# Patient Record
Sex: MALE | Race: WHITE
[De-identification: names, ages, dates, MRNs, and addresses within clinical notes are randomized per-mention and may not be internally consistent; named-entity substitution may affect disease eponyms.]

---

## 2020-05-12 ENCOUNTER — HOSPITAL ENCOUNTER (OUTPATIENT)
Dept: HOSPITAL 47 - RADUSWWP | Age: 45
Discharge: HOME | End: 2020-05-12
Attending: FAMILY MEDICINE
Payer: COMMERCIAL

## 2020-05-12 DIAGNOSIS — R10.12: ICD-10-CM

## 2020-05-12 DIAGNOSIS — R10.13: ICD-10-CM

## 2020-05-12 DIAGNOSIS — R10.11: Primary | ICD-10-CM

## 2020-05-12 PROCEDURE — 76705 ECHO EXAM OF ABDOMEN: CPT

## 2020-05-12 NOTE — US
EXAMINATION TYPE: US abdomen limited

 

DATE OF EXAM: 5/12/2020

 

COMPARISON: NONE

 

CLINICAL HISTORY: R10.13 Epigastric pain, R10.11 right upper quadrant. Constant epigastric pain x 1 m
onth, occasionally increases in intensity

 

EXAM MEASUREMENTS:

 

Liver Length:  15.8 cm   

Gallbladder Wall:  0.1 cm   

CBD:  0.4 cm

Right Kidney:  10.3 x 4.7 x 4.5 cm

 

 

 

Pancreas:  visualized portions wnl, limited by overlying midline bowel gas

Liver:  wnl  

Gallbladder:  wnl

**Evidence for sonographic Alexandra's sign:  no

CBD:  wnl 

Right Kidney:  wnl 

 

 

 

IMPRESSION: 

1. Right upper quadrant ultrasound appears unremarkable.

## 2020-07-09 ENCOUNTER — HOSPITAL ENCOUNTER (OUTPATIENT)
Dept: HOSPITAL 47 - RADXRMAIN | Age: 45
Discharge: HOME | End: 2020-07-09
Attending: FAMILY MEDICINE
Payer: COMMERCIAL

## 2020-07-09 DIAGNOSIS — R05: ICD-10-CM

## 2020-07-09 DIAGNOSIS — R07.82: Primary | ICD-10-CM

## 2020-07-09 LAB
ALBUMIN SERPL-MCNC: 4.4 G/DL (ref 3.5–5)
ALP SERPL-CCNC: 89 U/L (ref 38–126)
ALT SERPL-CCNC: 18 U/L (ref 4–49)
ANION GAP SERPL CALC-SCNC: 8 MMOL/L
AST SERPL-CCNC: 21 U/L (ref 17–59)
BASOPHILS # BLD AUTO: 0.1 K/UL (ref 0–0.2)
BASOPHILS NFR BLD AUTO: 1 %
BUN SERPL-SCNC: 10 MG/DL (ref 9–20)
CALCIUM SPEC-MCNC: 9.3 MG/DL (ref 8.4–10.2)
CHLORIDE SERPL-SCNC: 102 MMOL/L (ref 98–107)
CO2 SERPL-SCNC: 25 MMOL/L (ref 22–30)
EOSINOPHIL # BLD AUTO: 0.4 K/UL (ref 0–0.7)
EOSINOPHIL NFR BLD AUTO: 3 %
ERYTHROCYTE [DISTWIDTH] IN BLOOD BY AUTOMATED COUNT: 4.99 M/UL (ref 4.3–5.9)
ERYTHROCYTE [DISTWIDTH] IN BLOOD: 12.5 % (ref 11.5–15.5)
GLUCOSE SERPL-MCNC: 92 MG/DL (ref 74–99)
HCT VFR BLD AUTO: 47 % (ref 39–53)
HGB BLD-MCNC: 15.2 GM/DL (ref 13–17.5)
LYMPHOCYTES # SPEC AUTO: 2.4 K/UL (ref 1–4.8)
LYMPHOCYTES NFR SPEC AUTO: 22 %
MCH RBC QN AUTO: 30.4 PG (ref 25–35)
MCHC RBC AUTO-ENTMCNC: 32.4 G/DL (ref 31–37)
MCV RBC AUTO: 94.1 FL (ref 80–100)
MONOCYTES # BLD AUTO: 0.7 K/UL (ref 0–1)
MONOCYTES NFR BLD AUTO: 6 %
NEUTROPHILS # BLD AUTO: 7.3 K/UL (ref 1.3–7.7)
NEUTROPHILS NFR BLD AUTO: 67 %
PLATELET # BLD AUTO: 302 K/UL (ref 150–450)
POTASSIUM SERPL-SCNC: 4.4 MMOL/L (ref 3.5–5.1)
PROT SERPL-MCNC: 7 G/DL (ref 6.3–8.2)
SODIUM SERPL-SCNC: 135 MMOL/L (ref 137–145)
WBC # BLD AUTO: 11 K/UL (ref 3.8–10.6)

## 2020-07-09 PROCEDURE — 80053 COMPREHEN METABOLIC PANEL: CPT

## 2020-07-09 PROCEDURE — 71046 X-RAY EXAM CHEST 2 VIEWS: CPT

## 2020-07-09 PROCEDURE — 86140 C-REACTIVE PROTEIN: CPT

## 2020-07-09 PROCEDURE — 85652 RBC SED RATE AUTOMATED: CPT

## 2020-07-09 PROCEDURE — 85025 COMPLETE CBC W/AUTO DIFF WBC: CPT

## 2020-07-09 NOTE — XR
EXAMINATION TYPE: XR chest 2V

 

DATE OF EXAM: 7/9/2020

 

COMPARISON: NONE

 

HISTORY: Cough and rib pain

 

TECHNIQUE:  Frontal and lateral views of the chest are obtained.

 

FINDINGS:  There is no focal air space opacity, pleural effusion, or pneumothorax seen.  The cardiac 
silhouette size is within normal limits.   The osseous structures are intact. Lung apices not entirel
y included on exam.

 

IMPRESSION:  No acute cardiopulmonary process.

## 2020-07-29 ENCOUNTER — HOSPITAL ENCOUNTER (OUTPATIENT)
Dept: HOSPITAL 47 - RADNMMAIN | Age: 45
Discharge: HOME | End: 2020-07-29
Attending: FAMILY MEDICINE
Payer: COMMERCIAL

## 2020-07-29 DIAGNOSIS — R10.11: Primary | ICD-10-CM

## 2020-07-29 DIAGNOSIS — Z88.8: ICD-10-CM

## 2020-07-29 DIAGNOSIS — R10.13: ICD-10-CM

## 2020-07-29 DIAGNOSIS — Z91.018: ICD-10-CM

## 2020-07-29 PROCEDURE — 78226 HEPATOBILIARY SYSTEM IMAGING: CPT

## 2020-07-29 NOTE — NM
EXAMINATION TYPE: NM hepatobiliary w EF

 

DATE OF EXAM: 7/29/2020

 

COMPARISON: Ultrasound abdomen 5/12/2020

 

HISTORY: Right upper quadrant pain

 

TECHNIQUE: After the intravenous administration of 4.89 mCi Tc 99m Mebrofenin hepatobiliary scintigra
phy is performed.  Immediate images post injection.

 

FINDINGS: 

There is satisfactory initial accumulation of tracer by the liver.  The gallbladder is visualized wit
hin 15 minutes.  The small bowel activity is noted within 15 minutes.  At one hour 8 ounces of oral e
nsure plus is given to mimic CCK and gallbladder ejection fraction is calculated at 73 %, in the norm
al range.  Therefore there is no scintigraphic evidence of cystic or common bile duct obstruction to 
suggest acute cholecystitis or gallbladder dyskinesia. 

 

IMPRESSION: Exam is within normal limits.

## 2020-08-20 ENCOUNTER — HOSPITAL ENCOUNTER (OUTPATIENT)
Dept: HOSPITAL 47 - RADCTMAIN | Age: 45
Discharge: HOME | End: 2020-08-20
Attending: INTERNAL MEDICINE
Payer: COMMERCIAL

## 2020-08-20 DIAGNOSIS — R10.13: Primary | ICD-10-CM

## 2020-08-20 PROCEDURE — 74160 CT ABDOMEN W/CONTRAST: CPT

## 2020-08-20 NOTE — CT
EXAMINATION TYPE: CT abdomen w con

 

DATE OF EXAM: 8/20/2020

 

COMPARISON: Ultrasound abdomen limited May 12, 2020

 

HISTORY: Epigastric pain

 

CT DLP: 401.1 mGycm, Automated Exposure Control for Dose Reduction was Utilized.

 

CONTRAST: 

CT scan of the abdomen is performed with oral and with IV Contrast, patient injected with 100 mL of I
sovue 300.

 

FINDINGS:

 

LUNG BASES:  No significant abnormality is appreciated.

 

LIVER/GB:   No significant abnormality is appreciated.

 

PANCREAS:  No significant abnormality is seen.

 

SPLEEN:  No significant abnormality is seen.

 

ADRENALS:  No significant abnormality is seen.

 

KIDNEYS: Symmetrical medullary uptake and excretion without hydronephrosis seen bilaterally.

 

BOWEL: Oral contrast reaches level proximal transverse colon making evaluation of visualized distal b
owel slightly suboptimal. No suspicious small or large bowel dilatation. There is mild to moderate wa
ll thickening in the proximal transverse colon through the visualized portion of the distal left colo
n. Normal-appearing appendix incidentally noted from the cecum in the right lower quadrant extending 
medially.

 

LYMPH NODES:  No greater than 1cm abdominal lymph nodes are appreciated.

 

OSSEOUS STRUCTURES: Mild-to-moderate disc space narrowing lumbosacral junction.

 

OTHER:  No significant additional abnormality is seen.

 

IMPRESSION: Possible mild to borderline moderate uncomplicated acute colitis versus product of poor d
istention. Correlate clinically. Differential would include infectious and/or inflammatory etiologies
.

## 2021-01-03 ENCOUNTER — HOSPITAL ENCOUNTER (EMERGENCY)
Dept: HOSPITAL 47 - EC | Age: 46
Discharge: HOME | End: 2021-01-03
Payer: COMMERCIAL

## 2021-01-03 VITALS — RESPIRATION RATE: 18 BRPM

## 2021-01-03 VITALS — HEART RATE: 78 BPM | DIASTOLIC BLOOD PRESSURE: 82 MMHG | SYSTOLIC BLOOD PRESSURE: 128 MMHG | TEMPERATURE: 97.7 F

## 2021-01-03 DIAGNOSIS — Z88.6: ICD-10-CM

## 2021-01-03 DIAGNOSIS — R07.9: ICD-10-CM

## 2021-01-03 DIAGNOSIS — R00.2: Primary | ICD-10-CM

## 2021-01-03 DIAGNOSIS — F17.200: ICD-10-CM

## 2021-01-03 LAB
ALBUMIN SERPL-MCNC: 4.4 G/DL (ref 3.5–5)
ALP SERPL-CCNC: 72 U/L (ref 38–126)
ALT SERPL-CCNC: 25 U/L (ref 4–49)
AMYLASE SERPL-CCNC: 60 U/L (ref 30–110)
ANION GAP SERPL CALC-SCNC: 7 MMOL/L
APTT BLD: 22.9 SEC (ref 22–30)
AST SERPL-CCNC: 30 U/L (ref 17–59)
BASOPHILS # BLD AUTO: 0.2 K/UL (ref 0–0.2)
BASOPHILS NFR BLD AUTO: 2 %
BUN SERPL-SCNC: 13 MG/DL (ref 9–20)
CALCIUM SPEC-MCNC: 9 MG/DL (ref 8.4–10.2)
CHLORIDE SERPL-SCNC: 103 MMOL/L (ref 98–107)
CO2 SERPL-SCNC: 25 MMOL/L (ref 22–30)
EOSINOPHIL # BLD AUTO: 0.6 K/UL (ref 0–0.7)
EOSINOPHIL NFR BLD AUTO: 7 %
ERYTHROCYTE [DISTWIDTH] IN BLOOD BY AUTOMATED COUNT: 4.91 M/UL (ref 4.3–5.9)
ERYTHROCYTE [DISTWIDTH] IN BLOOD: 12.1 % (ref 11.5–15.5)
GLUCOSE SERPL-MCNC: 104 MG/DL (ref 74–99)
HCT VFR BLD AUTO: 43.8 % (ref 39–53)
HGB BLD-MCNC: 14.9 GM/DL (ref 13–17.5)
INR PPP: 1 (ref ?–1.2)
LIPASE SERPL-CCNC: 114 U/L (ref 23–300)
LYMPHOCYTES # SPEC AUTO: 3.6 K/UL (ref 1–4.8)
LYMPHOCYTES NFR SPEC AUTO: 42 %
MAGNESIUM SPEC-SCNC: 1.7 MG/DL (ref 1.6–2.3)
MCH RBC QN AUTO: 30.3 PG (ref 25–35)
MCHC RBC AUTO-ENTMCNC: 34 G/DL (ref 31–37)
MCV RBC AUTO: 89.2 FL (ref 80–100)
MONOCYTES # BLD AUTO: 0.7 K/UL (ref 0–1)
MONOCYTES NFR BLD AUTO: 8 %
NEUTROPHILS # BLD AUTO: 3.3 K/UL (ref 1.3–7.7)
NEUTROPHILS NFR BLD AUTO: 38 %
PLATELET # BLD AUTO: 242 K/UL (ref 150–450)
POTASSIUM SERPL-SCNC: 4.2 MMOL/L (ref 3.5–5.1)
PROT SERPL-MCNC: 7.4 G/DL (ref 6.3–8.2)
PT BLD: 10.1 SEC (ref 9–12)
SODIUM SERPL-SCNC: 135 MMOL/L (ref 137–145)
WBC # BLD AUTO: 8.7 K/UL (ref 3.8–10.6)

## 2021-01-03 PROCEDURE — 71046 X-RAY EXAM CHEST 2 VIEWS: CPT

## 2021-01-03 PROCEDURE — 36415 COLL VENOUS BLD VENIPUNCTURE: CPT

## 2021-01-03 PROCEDURE — 83690 ASSAY OF LIPASE: CPT

## 2021-01-03 PROCEDURE — 85379 FIBRIN DEGRADATION QUANT: CPT

## 2021-01-03 PROCEDURE — 85025 COMPLETE CBC W/AUTO DIFF WBC: CPT

## 2021-01-03 PROCEDURE — 84484 ASSAY OF TROPONIN QUANT: CPT

## 2021-01-03 PROCEDURE — 85610 PROTHROMBIN TIME: CPT

## 2021-01-03 PROCEDURE — 93005 ELECTROCARDIOGRAM TRACING: CPT

## 2021-01-03 PROCEDURE — 99285 EMERGENCY DEPT VISIT HI MDM: CPT

## 2021-01-03 PROCEDURE — 85730 THROMBOPLASTIN TIME PARTIAL: CPT

## 2021-01-03 PROCEDURE — 82150 ASSAY OF AMYLASE: CPT

## 2021-01-03 PROCEDURE — 80053 COMPREHEN METABOLIC PANEL: CPT

## 2021-01-03 PROCEDURE — 83735 ASSAY OF MAGNESIUM: CPT

## 2021-01-03 NOTE — XR
EXAM:

  XR Chest, 2 Views

 

CLINICAL HISTORY:

  ITS.REASON XR Reason: Chest Pain

 

TECHNIQUE:

  Frontal and lateral views of the chest.

 

COMPARISON:

  7/9/2020

 

FINDINGS:

  Lungs:  Unremarkable.  No consolidation.

  Pleural space:  Unremarkable.  No pneumothorax.

  Heart:  Unremarkable.  No cardiomegaly.

  Mediastinum:  Unremarkable.

  Bones/joints:  Unremarkable.

 

IMPRESSION:     

No acute pulmonary process.

## 2021-01-03 NOTE — ED
Arrhythmia/Palpitations HPI





- General


Chief Complaint: Chest Pain


Stated Complaint: tachycardia


Time Seen by Provider: 01/03/21 03:30


Source: EMS


Mode of arrival: EMS


Limitations: no limitations





- History of Present Illness


Initial Comments: 





This patient is a 46-year-old man who presents to be evaluated for racing heart.

 The patient states that he developed a brief sharp pain in his chest that 

resolved rapidly, but noticed that his heart was seeming to race.  He checked 

with a home pulse oximetry monitor and his heart rate was about 170.  Patient d

ecided come to the emergency department when it seemed to be persisting.  He was

not having any anginal type symptoms, no dyspnea, diaphoresis, nausea or 

vomiting, no other chest pain associated with the palpitations.  The heart rate 

seemed to resolve spontaneously on the way here.


MD Complaint: "heart racing"


-: minutes(s)


Context: occurred during rest


Associated Symptoms: denies other symptoms





- Related Data


                                Home Medications











 Medication  Instructions  Recorded  Confirmed


 


Omeprazole [PriLOSEC] 10 mg PO 01/03/21 











                                    Allergies











Allergy/AdvReac Type Severity Reaction Status Date / Time


 


aspirin Allergy  Dyspnea Verified 01/03/21 03:31


 


ibuprofen Allergy  Dyspnea Verified 01/03/21 03:31














Review of Systems


ROS Statement: 


Those systems with pertinent positive or pertinent negative responses have been 

documented in the HPI.





ROS Other: All systems not noted in ROS Statement are negative.


Constitutional: Denies: fever, chills


Respiratory: Denies: cough, dyspnea, wheezes


Cardiovascular: Reports: as per HPI, chest pain, palpitations.  Denies: dyspnea 

on exertion, orthopnea, edema, syncope


Gastrointestinal: Denies: abdominal pain, nausea, vomiting, melena, hematochezia


Genitourinary: Denies: dysuria, hematuria


Musculoskeletal: Denies: back pain


Skin: Denies: rash


Neurological: Denies: headache, weakness, numbness





Past Medical History


Past Medical History: Asthma


History of Any Multi-Drug Resistant Organisms: None Reported


Past Surgical History: Hernia Repair


Past Psychological History: Anxiety


Smoking Status: Current every day smoker


Past Alcohol Use History: Occasional


Past Drug Use History: None Reported





General Exam


Limitations: no limitations


General appearance: alert, in no apparent distress


Head exam: Present: atraumatic, normocephalic


Eye exam: Present: normal appearance.  Absent: scleral icterus, conjunctival 

injection


Neck exam: Present: normal inspection


Respiratory exam: Present: normal lung sounds bilaterally.  Absent: respiratory 

distress, wheezes, rales, rhonchi, stridor


Cardiovascular Exam: Present: regular rate, normal rhythm, normal heart sounds. 

 Absent: systolic murmur, diastolic murmur, rubs, gallop


GI/Abdominal exam: Present: soft.  Absent: distended, tenderness, guarding, 

rebound, rigid, mass


Extremities exam: Present: normal inspection, normal capillary refill.  Absent: 

pedal edema, calf tenderness


Back exam: Present: normal inspection.  Absent: CVA tenderness (R), CVA 

tenderness (L)


Neurological exam: Present: alert


Skin exam: Present: warm, dry, intact, normal color.  Absent: rash





Course


                                   Vital Signs











  01/03/21





  03:15


 


Pulse Rate 98


 


Respiratory 19





Rate 


 


Blood Pressure 143/97


 


O2 Sat by Pulse 97





Oximetry 














EKG Findings





- EKG Results:


EKG: interpreted by EPI, sinus rhythm (Rate 83 bpm), normal axis, normal ST/T





- Blocks, Axis, Hypertrophy, ST Abn:


AV and intraventricular conduction: right bundle branch block 

(fixed/intermittent, complete/incomplete) (Incomplete)





Medical Decision Making





- Medical Decision Making





Patient is a 46-year-old man presenting after episode of elevated heart rate 

which sounds consistent with SVT.  I discussed admission for telemetry 

monitoring, serial cardiac enzymes and cardiology consultation, at this point 

the patient is feeling well and would like to have outpatient follow-up.  I 

recommended Holter monitoring, returning if any symptoms recur or new symptoms 

develop, and discussed appropriate further care and follow-up.





- Lab Data


Result diagrams: 


                                 01/03/21 03:51





                                 01/03/21 03:51


                                   Lab Results











  01/03/21 01/03/21 01/03/21 Range/Units





  03:51 03:51 03:51 


 


WBC  8.7    (3.8-10.6)  k/uL


 


RBC  4.91    (4.30-5.90)  m/uL


 


Hgb  14.9    (13.0-17.5)  gm/dL


 


Hct  43.8    (39.0-53.0)  %


 


MCV  89.2    (80.0-100.0)  fL


 


MCH  30.3    (25.0-35.0)  pg


 


MCHC  34.0    (31.0-37.0)  g/dL


 


RDW  12.1    (11.5-15.5)  %


 


Plt Count  242    (150-450)  k/uL


 


MPV  8.6    


 


Neutrophils %  38    %


 


Lymphocytes %  42    %


 


Monocytes %  8    %


 


Eosinophils %  7    %


 


Basophils %  2    %


 


Neutrophils #  3.3    (1.3-7.7)  k/uL


 


Lymphocytes #  3.6    (1.0-4.8)  k/uL


 


Monocytes #  0.7    (0-1.0)  k/uL


 


Eosinophils #  0.6    (0-0.7)  k/uL


 


Basophils #  0.2    (0-0.2)  k/uL


 


PT   10.1   (9.0-12.0)  sec


 


INR   1.0   (<1.2)  


 


APTT   22.9   (22.0-30.0)  sec


 


D-Dimer   <0.17   (<0.60)  mg/L FEU


 


Sodium    135 L  (137-145)  mmol/L


 


Potassium    4.2  (3.5-5.1)  mmol/L


 


Chloride    103  ()  mmol/L


 


Carbon Dioxide    25  (22-30)  mmol/L


 


Anion Gap    7  mmol/L


 


BUN    13  (9-20)  mg/dL


 


Creatinine    0.72  (0.66-1.25)  mg/dL


 


Est GFR (CKD-EPI)AfAm    >90  (>60 ml/min/1.73 sqM)  


 


Est GFR (CKD-EPI)NonAf    >90  (>60 ml/min/1.73 sqM)  


 


Glucose    104 H  (74-99)  mg/dL


 


Calcium    9.0  (8.4-10.2)  mg/dL


 


Magnesium    1.7  (1.6-2.3)  mg/dL


 


Total Bilirubin    0.7  (0.2-1.3)  mg/dL


 


AST    30  (17-59)  U/L


 


ALT    25  (4-49)  U/L


 


Alkaline Phosphatase    72  ()  U/L


 


Troponin I     (0.000-0.034)  ng/mL


 


Total Protein    7.4  (6.3-8.2)  g/dL


 


Albumin    4.4  (3.5-5.0)  g/dL


 


Amylase    60  ()  U/L


 


Lipase    114  ()  U/L














  01/03/21 Range/Units





  03:51 


 


WBC   (3.8-10.6)  k/uL


 


RBC   (4.30-5.90)  m/uL


 


Hgb   (13.0-17.5)  gm/dL


 


Hct   (39.0-53.0)  %


 


MCV   (80.0-100.0)  fL


 


MCH   (25.0-35.0)  pg


 


MCHC   (31.0-37.0)  g/dL


 


RDW   (11.5-15.5)  %


 


Plt Count   (150-450)  k/uL


 


MPV   


 


Neutrophils %   %


 


Lymphocytes %   %


 


Monocytes %   %


 


Eosinophils %   %


 


Basophils %   %


 


Neutrophils #   (1.3-7.7)  k/uL


 


Lymphocytes #   (1.0-4.8)  k/uL


 


Monocytes #   (0-1.0)  k/uL


 


Eosinophils #   (0-0.7)  k/uL


 


Basophils #   (0-0.2)  k/uL


 


PT   (9.0-12.0)  sec


 


INR   (<1.2)  


 


APTT   (22.0-30.0)  sec


 


D-Dimer   (<0.60)  mg/L FEU


 


Sodium   (137-145)  mmol/L


 


Potassium   (3.5-5.1)  mmol/L


 


Chloride   ()  mmol/L


 


Carbon Dioxide   (22-30)  mmol/L


 


Anion Gap   mmol/L


 


BUN   (9-20)  mg/dL


 


Creatinine   (0.66-1.25)  mg/dL


 


Est GFR (CKD-EPI)AfAm   (>60 ml/min/1.73 sqM)  


 


Est GFR (CKD-EPI)NonAf   (>60 ml/min/1.73 sqM)  


 


Glucose   (74-99)  mg/dL


 


Calcium   (8.4-10.2)  mg/dL


 


Magnesium   (1.6-2.3)  mg/dL


 


Total Bilirubin   (0.2-1.3)  mg/dL


 


AST   (17-59)  U/L


 


ALT   (4-49)  U/L


 


Alkaline Phosphatase   ()  U/L


 


Troponin I  <0.012  (0.000-0.034)  ng/mL


 


Total Protein   (6.3-8.2)  g/dL


 


Albumin   (3.5-5.0)  g/dL


 


Amylase   ()  U/L


 


Lipase   ()  U/L














Disposition


Clinical Impression: 


 Palpitations





Disposition: HOME SELF-CARE


Condition: Good


Instructions (If sedation given, give patient instructions):  Heart Palpitations

 (ED)


Additional Instructions: 


The episode of rapid heartbeat is concerning for SVT.  Call your primary 

physician or the cardiologist to set up a Holter monitoring.  If you have any of

 the symptoms we discussed or are not feeling well in anyway return to the 

emergency department immediately


Is patient prescribed a controlled substance at d/c from ED?: No


Referrals: 


Vanesa Giraldo MD [Primary Care Provider] - 1-2 days


Rafa Ellis MD [STAFF PHYSICIAN] - 1-2 days

## 2021-03-23 ENCOUNTER — HOSPITAL ENCOUNTER (OUTPATIENT)
Dept: HOSPITAL 47 - RADCTMAIN | Age: 46
End: 2021-03-23
Attending: OTOLARYNGOLOGY
Payer: COMMERCIAL

## 2021-03-23 DIAGNOSIS — J32.4: Primary | ICD-10-CM

## 2021-03-23 PROCEDURE — 70486 CT MAXILLOFACIAL W/O DYE: CPT

## 2021-03-23 NOTE — CT
EXAMINATION TYPE: CT sinus wo con

 

DATE OF EXAM: 3/23/2021

 

COMPARISON: NONE

 

HISTORY: Chronic sinusitis per order. Symptoms of headache, congestion, and dizziness for years per p
atient.

 

CT DLP: 1168 mGycm.  Automated Exposure Control for Dose Reduction was Utilized.

 

TECHNIQUE: CT scan of the sinuses is performed without contrast, axial images are obtained, coronal r
eformatted images are also reviewed.

 

FINDINGS: There is hypoplastic or nonformed left frontal sinus. There is completely opacified right f
rontal sinus with sclerotic wall thickening.  

 

Mild-to-moderate mucosal thickening anterior sphenoid sinuses bilaterally with patchy fluid in the ri
ght sphenoid sinus. Mild to moderate mucosal thickening posterior inferior left sphenoid sinus.

 

There is there complete opacification of ethmoid sinuses bilaterally.

 

Air-fluid level left maxillary sinus with mild to moderate mucosal thickening. Near-complete opacific
ation of right maxillary sinus.

 

The ostiomeatal complex is blocked bilaterally on the coronal images. 

 

Visualized portion of mastoid air cells show no abnormal opacification.  The globes are intact bilate
rally.  The visualized portion of brain parenchyma is unremarkable.

 

IMPRESSION: Acute on chronic paranasal pansinusitis.